# Patient Record
Sex: FEMALE | Race: WHITE | NOT HISPANIC OR LATINO | URBAN - METROPOLITAN AREA
[De-identification: names, ages, dates, MRNs, and addresses within clinical notes are randomized per-mention and may not be internally consistent; named-entity substitution may affect disease eponyms.]

---

## 2024-06-11 ENCOUNTER — HOSPITAL ENCOUNTER (EMERGENCY)
Facility: HOSPITAL | Age: 54
Discharge: HOME | End: 2024-06-11
Attending: EMERGENCY MEDICINE
Payer: COMMERCIAL

## 2024-06-11 VITALS
HEIGHT: 66 IN | DIASTOLIC BLOOD PRESSURE: 50 MMHG | TEMPERATURE: 98 F | BODY MASS INDEX: 28.28 KG/M2 | HEART RATE: 98 BPM | OXYGEN SATURATION: 95 % | RESPIRATION RATE: 14 BRPM | SYSTOLIC BLOOD PRESSURE: 94 MMHG | WEIGHT: 176 LBS

## 2024-06-11 DIAGNOSIS — K62.89 PROCTITIS: ICD-10-CM

## 2024-06-11 DIAGNOSIS — K59.00 CONSTIPATION, UNSPECIFIED CONSTIPATION TYPE: Primary | ICD-10-CM

## 2024-06-11 LAB
ALBUMIN SERPL BCP-MCNC: 4.2 G/DL (ref 3.5–5.2)
ALP SERPL-CCNC: 71 U/L (ref 55–135)
ALT SERPL W/O P-5'-P-CCNC: 11 U/L (ref 10–44)
ANION GAP SERPL CALC-SCNC: 9 MMOL/L (ref 8–16)
AST SERPL-CCNC: 16 U/L (ref 10–40)
BASOPHILS # BLD AUTO: 0.05 K/UL (ref 0–0.2)
BASOPHILS NFR BLD: 0.3 % (ref 0–1.9)
BILIRUB SERPL-MCNC: 1.4 MG/DL (ref 0.1–1)
BILIRUB UR QL STRIP: NEGATIVE
BUN SERPL-MCNC: 12 MG/DL (ref 6–20)
BUN SERPL-MCNC: 16 MG/DL (ref 6–30)
CALCIUM SERPL-MCNC: 10 MG/DL (ref 8.7–10.5)
CHLORIDE SERPL-SCNC: 107 MMOL/L (ref 95–110)
CHLORIDE SERPL-SCNC: 108 MMOL/L (ref 95–110)
CLARITY UR REFRACT.AUTO: CLEAR
CO2 SERPL-SCNC: 23 MMOL/L (ref 23–29)
COLOR UR AUTO: YELLOW
CREAT SERPL-MCNC: 0.6 MG/DL (ref 0.5–1.4)
CREAT SERPL-MCNC: 0.7 MG/DL (ref 0.5–1.4)
DIFFERENTIAL METHOD BLD: ABNORMAL
EOSINOPHIL # BLD AUTO: 0 K/UL (ref 0–0.5)
EOSINOPHIL NFR BLD: 0.1 % (ref 0–8)
ERYTHROCYTE [DISTWIDTH] IN BLOOD BY AUTOMATED COUNT: 12.8 % (ref 11.5–14.5)
EST. GFR  (NO RACE VARIABLE): >60 ML/MIN/1.73 M^2
GLUCOSE SERPL-MCNC: 95 MG/DL (ref 70–110)
GLUCOSE SERPL-MCNC: 96 MG/DL (ref 70–110)
GLUCOSE UR QL STRIP: NEGATIVE
HCT VFR BLD AUTO: 44.5 % (ref 37–48.5)
HCT VFR BLD CALC: 44 %PCV (ref 36–54)
HCV AB SERPL QL IA: NORMAL
HGB BLD-MCNC: 14.2 G/DL (ref 12–16)
HGB UR QL STRIP: NEGATIVE
HIV 1+2 AB+HIV1 P24 AG SERPL QL IA: NORMAL
IMM GRANULOCYTES # BLD AUTO: 0.07 K/UL (ref 0–0.04)
IMM GRANULOCYTES NFR BLD AUTO: 0.4 % (ref 0–0.5)
KETONES UR QL STRIP: NEGATIVE
LEUKOCYTE ESTERASE UR QL STRIP: NEGATIVE
LIPASE SERPL-CCNC: 18 U/L (ref 4–60)
LYMPHOCYTES # BLD AUTO: 1.4 K/UL (ref 1–4.8)
LYMPHOCYTES NFR BLD: 8.4 % (ref 18–48)
MCH RBC QN AUTO: 28 PG (ref 27–31)
MCHC RBC AUTO-ENTMCNC: 31.9 G/DL (ref 32–36)
MCV RBC AUTO: 88 FL (ref 82–98)
MONOCYTES # BLD AUTO: 0.5 K/UL (ref 0.3–1)
MONOCYTES NFR BLD: 3.2 % (ref 4–15)
NEUTROPHILS # BLD AUTO: 14.2 K/UL (ref 1.8–7.7)
NEUTROPHILS NFR BLD: 87.6 % (ref 38–73)
NITRITE UR QL STRIP: NEGATIVE
NRBC BLD-RTO: 0 /100 WBC
PH UR STRIP: 6 [PH] (ref 5–8)
PLATELET # BLD AUTO: 297 K/UL (ref 150–450)
PMV BLD AUTO: 10.6 FL (ref 9.2–12.9)
POC IONIZED CALCIUM: 1.09 MMOL/L (ref 1.06–1.42)
POC TCO2 (MEASURED): 26 MMOL/L (ref 23–29)
POCT GLUCOSE: 96 MG/DL (ref 70–110)
POTASSIUM BLD-SCNC: 5 MMOL/L (ref 3.5–5.1)
POTASSIUM SERPL-SCNC: 3.7 MMOL/L (ref 3.5–5.1)
PROT SERPL-MCNC: 7.9 G/DL (ref 6–8.4)
PROT UR QL STRIP: NEGATIVE
RBC # BLD AUTO: 5.07 M/UL (ref 4–5.4)
SAMPLE: NORMAL
SODIUM BLD-SCNC: 139 MMOL/L (ref 136–145)
SODIUM SERPL-SCNC: 140 MMOL/L (ref 136–145)
SP GR UR STRIP: 1.01 (ref 1–1.03)
URN SPEC COLLECT METH UR: NORMAL
WBC # BLD AUTO: 16.16 K/UL (ref 3.9–12.7)

## 2024-06-11 PROCEDURE — 86803 HEPATITIS C AB TEST: CPT | Performed by: PHYSICIAN ASSISTANT

## 2024-06-11 PROCEDURE — 25000003 PHARM REV CODE 250

## 2024-06-11 PROCEDURE — 96361 HYDRATE IV INFUSION ADD-ON: CPT

## 2024-06-11 PROCEDURE — 80047 BASIC METABLC PNL IONIZED CA: CPT

## 2024-06-11 PROCEDURE — 80053 COMPREHEN METABOLIC PANEL: CPT

## 2024-06-11 PROCEDURE — 96360 HYDRATION IV INFUSION INIT: CPT

## 2024-06-11 PROCEDURE — 25500020 PHARM REV CODE 255: Performed by: EMERGENCY MEDICINE

## 2024-06-11 PROCEDURE — 87389 HIV-1 AG W/HIV-1&-2 AB AG IA: CPT | Performed by: PHYSICIAN ASSISTANT

## 2024-06-11 PROCEDURE — 83690 ASSAY OF LIPASE: CPT

## 2024-06-11 PROCEDURE — 85025 COMPLETE CBC W/AUTO DIFF WBC: CPT | Performed by: EMERGENCY MEDICINE

## 2024-06-11 PROCEDURE — 81003 URINALYSIS AUTO W/O SCOPE: CPT | Performed by: EMERGENCY MEDICINE

## 2024-06-11 PROCEDURE — 82308 ASSAY OF CALCITONIN: CPT

## 2024-06-11 PROCEDURE — 82962 GLUCOSE BLOOD TEST: CPT

## 2024-06-11 PROCEDURE — 99285 EMERGENCY DEPT VISIT HI MDM: CPT | Mod: 25

## 2024-06-11 RX ORDER — SYRING-NEEDL,DISP,INSUL,0.3 ML 29 G X1/2"
296 SYRINGE, EMPTY DISPOSABLE MISCELLANEOUS
Status: COMPLETED | OUTPATIENT
Start: 2024-06-11 | End: 2024-06-11

## 2024-06-11 RX ORDER — GLYCERIN 1 G/1
1 SUPPOSITORY RECTAL ONCE
Status: DISCONTINUED | OUTPATIENT
Start: 2024-06-11 | End: 2024-06-11 | Stop reason: HOSPADM

## 2024-06-11 RX ORDER — PSEUDOEPHEDRINE/ACETAMINOPHEN 30MG-500MG
100 TABLET ORAL
Status: COMPLETED | OUTPATIENT
Start: 2024-06-11 | End: 2024-06-11

## 2024-06-11 RX ORDER — ATORVASTATIN CALCIUM 20 MG/1
20 TABLET, FILM COATED ORAL
COMMUNITY

## 2024-06-11 RX ORDER — TIRZEPATIDE 10 MG/.5ML
10 INJECTION, SOLUTION SUBCUTANEOUS WEEKLY
COMMUNITY

## 2024-06-11 RX ADMIN — IOHEXOL 75 ML: 350 INJECTION, SOLUTION INTRAVENOUS at 02:06

## 2024-06-11 RX ADMIN — Medication 100 ML: at 06:06

## 2024-06-11 RX ADMIN — SODIUM CHLORIDE 500 ML: 9 INJECTION, SOLUTION INTRAVENOUS at 05:06

## 2024-06-11 RX ADMIN — Medication 100 ML: at 04:06

## 2024-06-11 RX ADMIN — SODIUM CHLORIDE 500 ML: 9 INJECTION, SOLUTION INTRAVENOUS at 06:06

## 2024-06-11 RX ADMIN — MAGNESIUM CITRATE 296 ML: 1.75 LIQUID ORAL at 04:06

## 2024-06-11 RX ADMIN — SODIUM CHLORIDE 500 ML: 9 INJECTION, SOLUTION INTRAVENOUS at 04:06

## 2024-06-11 RX ADMIN — SODIUM CHLORIDE 1000 ML: 9 INJECTION, SOLUTION INTRAVENOUS at 03:06

## 2024-06-11 NOTE — ED NOTES
Patient identifiers verified and correct for  MS Malik  C/C: Rectal pain SEE NN  APPEARANCE: awake and alert in NAD. PAIN  0/10  SKIN: warm, dry and intact. No breakdown or bruising.  MUSCULOSKELETAL: Patient moving all extremities spontaneously, no obvious swelling or deformities noted. Ambulates independently.  RESPIRATORY: Denies shortness of breath.Respirations unlabored.   CARDIAC: Denies CP, 2+ distal pulses; no peripheral edema  ABDOMEN: Denies abd pain , denies nausea, vomiting, reports no BM x 3 days  : voids spontaneously, denies difficulty  Neurologic: AAO x 4; follows commands equal strength in all extremities; denies numbness/tingling. Denies dizziness  Denies new weakness

## 2024-06-11 NOTE — ED NOTES
Patient states no BM x 3days, has surgery scheduled Monday, reports she has seen tissue / swelling, did try enema PTA, denies abd pain, reports rectal pain only, denies n/v/d

## 2024-06-11 NOTE — ED PROVIDER NOTES
Encounter Date: 6/11/2024       History     Chief Complaint   Patient presents with    Rectal Problems     Telemed today may be impaction, obstruction or prolapse rectum     53-year-old female with no significant medical history presents to the ED regarding constipation. She reports being constipated for 45 days and not staying hydrated. She is from [place] and visiting in [facility]. She states her last BM was 3 days ago. She tried drinking coffee, jogging, and an enema. She had little relief this morning with mild liquid stool come out. She also tried manual disimpaction herself with little relief. She is complaining of rectal pain but no abdominal pain. Still passing gas. Denies nausea or vomiting. No blood in stool or melena. Denies fever or body aches. No other complaints at this time. Of note, she is scheduled for partial hysterectomy next week and stopped her Monjaro 2 weeks ago. Has had 2 C-sections, cholecystectomy, and gastric sleeve.  Denies anal sexual intercourse/activity.    The history is provided by the patient and medical records.     Review of patient's allergies indicates:  No Known Allergies  History reviewed. No pertinent past medical history.  History reviewed. No pertinent surgical history.  No family history on file.  Social History     Tobacco Use    Smoking status: Never    Smokeless tobacco: Never   Substance Use Topics    Alcohol use: Not Currently    Drug use: Not Currently     Review of Systems  See HPI  Physical Exam     Initial Vitals [06/11/24 1131]   BP Pulse Resp Temp SpO2   121/77 92 18 97.5 °F (36.4 °C) 98 %      MAP       --         Physical Exam    Constitutional: She appears well-developed and well-nourished. She is not diaphoretic. No distress.   HENT:   Head: Normocephalic and atraumatic.   Cardiovascular:  Normal rate.           Pulmonary/Chest: No respiratory distress.   Abdominal: Abdomen is soft. She exhibits no distension. There is no abdominal tenderness. There is no rebound  and no guarding.   Genitourinary: Rectum:      Tenderness and external hemorrhoid present.      No rectal mass or anal fissure.      Genitourinary Comments: RN, Maura, chaperone present throughout exam.  No mass or hard stool in rectal vault. Light brown stool present. No gross blood.       Neurological: She is alert and oriented to person, place, and time.   Psychiatric: She has a normal mood and affect.         ED Course   Procedures  Labs Reviewed   CBC W/ AUTO DIFFERENTIAL - Abnormal; Notable for the following components:       Result Value    WBC 16.16 (*)     MCHC 31.9 (*)     Gran # (ANC) 14.2 (*)     Immature Grans (Abs) 0.07 (*)     Gran % 87.6 (*)     Lymph % 8.4 (*)     Mono % 3.2 (*)     All other components within normal limits    Narrative:     Release to patient->Immediate   COMPREHENSIVE METABOLIC PANEL - Abnormal; Notable for the following components:    Total Bilirubin 1.4 (*)     All other components within normal limits   URINALYSIS, REFLEX TO URINE CULTURE    Narrative:     Specimen Source->Urine   HIV 1 / 2 ANTIBODY    Narrative:     Release to patient->Immediate   HEPATITIS C ANTIBODY    Narrative:     Release to patient->Immediate   LIPASE   ISTAT PROCEDURE   POCT GLUCOSE          Imaging Results               CT Abdomen Pelvis With IV Contrast NO Oral Contrast (Final result)  Result time 06/11/24 14:36:54      Final result by Luis F Snow MD (06/11/24 14:36:54)                   Impression:      1. Suspected nonspecific proctitis.  No perirectal or perianal abscess, and no bowel obstruction.  2. Remote operative change of cholecystectomy and gastric sleeve surgery.  3. Left renal 2 cm cyst.  4. Suspected uterine fibroids.  5. Few additional findings as above.  This report was flagged in Epic as abnormal.  This report was flagged in Epic as containing an incidental finding.      Electronically signed by: Luis F Snow MD  Date:    06/11/2024  Time:    14:36               Narrative:     EXAMINATION:  CT ABDOMEN PELVIS WITH IV CONTRAST    CLINICAL HISTORY:  Bowel obstruction suspected;    TECHNIQUE:  Low dose axial images, sagittal and coronal reformations were obtained from the lung bases to the pubic symphysis following the IV administration of 75 mL of Omnipaque 350 .  Oral contrast was not given.    COMPARISON:  None available.    FINDINGS:  Imaged lung bases are clear.  Base of the heart is within normal limits.    Remote operative change of cholecystectomy and presumed gastric sleeve surgery without acute complication.  No significant biliary ductal dilatation.    Portal vasculature appears patent.  Liver, pancreas, spleen and bilateral adrenal glands are within normal limits.    Bilateral kidneys are normal in overall size and location with relatively symmetric normal enhancement.  No hydronephrosis.  Bilateral minimal nonspecific perinephric stranding.  2 cm simple appearing exophytic cyst arising from the medial left renal 2 upper pole.  Ureters are normal in course and caliber.  Urinary bladder is within normal limits.    Uterus is slightly tilted towards the left, heterogeneous and lobulated suggesting underlying fibroids.  No adnexal mass.    There is abnormal circumferential wall thickening of the distal rectum with perirectal stranding, slight presacral edema and trace volume likely reactive free pelvic fluid.  No bowel obstruction.  Appendix and terminal ileum are within normal limits.  Remaining small and large loops of bowel are within normal limits.  No pneumatosis or portal venous gas.    No upper abdominal ascites, free air or lymphadenopathy by CT criteria.  No significant atherosclerosis.  No aortic aneurysm or dissection.    Tiny fat containing umbilical hernia.  Osseous structures show minimal to mild degenerative change without acute or destructive process seen.                                       Medications   sodium chloride 0.9% bolus 1,000 mL 1,000 mL (0 mLs  Intravenous Stopped 6/11/24 1755)   glycerin 99.5% topical solution 100 mL (100 mLs Rectal Given 6/11/24 1652)     And   magnesium citrate solution 296 mL (296 mLs Rectal Given 6/11/24 1652)     And   sodium chloride 0.9% bolus 500 mL 500 mL (0 mLs Rectal Stopped 6/11/24 1755)   iohexoL (OMNIPAQUE 350) injection 75 mL (75 mLs Intravenous Given 6/11/24 1413)   glycerin 99.5% topical solution 100 mL (100 mLs Rectal Given 6/11/24 1830)     And   sodium chloride 0.9% bolus 500 mL 500 mL (0 mLs Rectal Stopped 6/11/24 1901)   sodium chloride 0.9% bolus 500 mL 500 mL (0 mLs Intravenous Stopped 6/11/24 1914)     Medical Decision Making  Amount and/or Complexity of Data Reviewed  Labs: ordered. Decision-making details documented in ED Course.  Radiology:  Decision-making details documented in ED Course.    Risk  OTC drugs.  Prescription drug management.         APC / Resident Notes:   Emergent evaluation of 53-year-old female with chief complaint of rectal pain and constipation.  Vitals WNL.  Clinically well-appearing, in no acute distress.  See physical exam findings above.  Given abdomen is soft, nontender with no nausea/vomiting and still passing gas, doubt bowel obstruction.  Do not think imaging is warranted at this time.  Suspect constipation causing rectal pain, will obtain labs and give enema.    My differential diagnoses include but are not limited to:   Constipation, fecal impaction, dehydration, electrolyte abnormality, LIU    See ED course.        ED Course as of 06/12/24 0031   Tue Jun 11, 2024   1337 WBC(!): 16.16  Given leukocytosis, will obtain CT abdomen/pelvis [KB]   1409 Urinalysis, Reflex to Urine Culture Urine, Clean Catch  WNL. No infection [KB]   1454 Lipase: 18 [KB]   1454 Comprehensive metabolic panel(!)  No significantly metabolic or electrolyte abnormality. No LIU. Mild hyperbilirubinemia with no elevated LFTs [KB]   1455 CT Abdomen Pelvis With IV Contrast NO Oral Contrast(!)  Impression:     1.  Suspected nonspecific proctitis.  No perirectal or perianal abscess, and no bowel obstruction.  2. Remote operative change of cholecystectomy and gastric sleeve surgery.  3. Left renal 2 cm cyst.  4. Suspected uterine fibroids.  5. Few additional findings as above. [KB]   5069 Suspect leukocytosis is from inflammation due to constipation, low suspicion for infection given no fever.  Will hold off on antibiotics.  Repeat rectal exam, was able to palpate stool after enema. Manually disimpacted some stool but still hard ball distal to finger. Will repeat enema. [KB]   5105 Patient reports having bowel movement improvement in symptoms.  She would like to be discharged at this time. Educated on bowel regimen. Will go home and do MiraLax and stool softeners. Strict ED return precautions provided with all questions answered.  She verbalized understanding and agreed to plan. [KB]      ED Course User Index  [KB] Dianelys Marino PA-C                           Clinical Impression:  Final diagnoses:  [K62.89] Proctitis  [K59.00] Constipation, unspecified constipation type (Primary)          ED Disposition Condition    Discharge Stable          ED Prescriptions    None       Follow-up Information       Follow up With Specialties Details Why Contact Info    [facility] - Emergency Dept Emergency Medicine Go to  If symptoms worsen [address]  [facility] [id number] [phone]             Dianelys Marino PA-C  06/12/24 0037

## 2024-06-11 NOTE — FIRST PROVIDER EVALUATION
"Medical screening examination initiated.  I have conducted a focused provider triage encounter, findings are as follows:    Brief history of present illness:  52 yo F presents w/ c/o fecal impaction, r/o obstruction, rectal prolapse. Chief compliant is tenesmus    H/o gastric sleeve  Just started mounjaro  Partial hysterectomy scheduled next week    Vitals:    06/11/24 1131   BP: 121/77   Pulse: 92   Resp: 18   Temp: 97.5 °F (36.4 °C)   TempSrc: Oral   SpO2: 98%   Weight: 79.8 kg (176 lb)   Height: 5' 6" (1.676 m)       Pertinent physical exam:  pain when sitting    Brief workup plan:  cbc, cmp, lipase, ct abd pelvis    Preliminary workup initiated; this workup will be continued and followed by the physician or advanced practice provider that is assigned to the patient when roomed.  "